# Patient Record
Sex: MALE | Race: WHITE | NOT HISPANIC OR LATINO | Employment: OTHER | URBAN - METROPOLITAN AREA
[De-identification: names, ages, dates, MRNs, and addresses within clinical notes are randomized per-mention and may not be internally consistent; named-entity substitution may affect disease eponyms.]

---

## 2021-09-08 ENCOUNTER — HOSPITAL ENCOUNTER (EMERGENCY)
Facility: HOSPITAL | Age: 76
Discharge: HOME | End: 2021-09-08
Attending: EMERGENCY MEDICINE | Admitting: EMERGENCY MEDICINE

## 2021-09-08 ENCOUNTER — APPOINTMENT (OUTPATIENT)
Dept: GENERAL RADIOLOGY | Facility: HOSPITAL | Age: 76
End: 2021-09-08

## 2021-09-08 VITALS
RESPIRATION RATE: 20 BRPM | BODY MASS INDEX: 27.4 KG/M2 | DIASTOLIC BLOOD PRESSURE: 55 MMHG | HEIGHT: 69 IN | SYSTOLIC BLOOD PRESSURE: 104 MMHG | HEART RATE: 81 BPM | OXYGEN SATURATION: 96 % | WEIGHT: 185 LBS | TEMPERATURE: 98.9 F

## 2021-09-08 DIAGNOSIS — U09.9 POST-COVID-19 SYNDROME: Primary | ICD-10-CM

## 2021-09-08 DIAGNOSIS — Z99.81 SUPPLEMENTAL OXYGEN DEPENDENT: ICD-10-CM

## 2021-09-08 LAB
ALBUMIN SERPL-MCNC: 3.3 G/DL (ref 3.5–5.2)
ALBUMIN/GLOB SERPL: 0.9 G/DL
ALP SERPL-CCNC: 63 U/L (ref 39–117)
ALT SERPL W P-5'-P-CCNC: 6 U/L (ref 1–41)
ANION GAP SERPL CALCULATED.3IONS-SCNC: 11 MMOL/L (ref 5–15)
AST SERPL-CCNC: 17 U/L (ref 1–40)
BASOPHILS # BLD AUTO: 0.02 10*3/MM3 (ref 0–0.2)
BASOPHILS NFR BLD AUTO: 0.2 % (ref 0–1.5)
BILIRUB SERPL-MCNC: 1 MG/DL (ref 0–1.2)
BUN SERPL-MCNC: 20 MG/DL (ref 8–23)
BUN/CREAT SERPL: 21.7 (ref 7–25)
CALCIUM SPEC-SCNC: 9 MG/DL (ref 8.6–10.5)
CHLORIDE SERPL-SCNC: 103 MMOL/L (ref 98–107)
CO2 SERPL-SCNC: 29 MMOL/L (ref 22–29)
CREAT SERPL-MCNC: 0.92 MG/DL (ref 0.76–1.27)
DEPRECATED RDW RBC AUTO: 46 FL (ref 37–54)
EOSINOPHIL # BLD AUTO: 0.1 10*3/MM3 (ref 0–0.4)
EOSINOPHIL NFR BLD AUTO: 0.9 % (ref 0.3–6.2)
ERYTHROCYTE [DISTWIDTH] IN BLOOD BY AUTOMATED COUNT: 13.6 % (ref 12.3–15.4)
GFR SERPL CREATININE-BSD FRML MDRD: 80 ML/MIN/1.73
GLOBULIN UR ELPH-MCNC: 3.6 GM/DL
GLUCOSE SERPL-MCNC: 130 MG/DL (ref 65–99)
HCT VFR BLD AUTO: 42.8 % (ref 37.5–51)
HGB BLD-MCNC: 14.3 G/DL (ref 13–17.7)
HOLD SPECIMEN: NORMAL
IMM GRANULOCYTES # BLD AUTO: 0.17 10*3/MM3 (ref 0–0.05)
IMM GRANULOCYTES NFR BLD AUTO: 1.6 % (ref 0–0.5)
LYMPHOCYTES # BLD AUTO: 0.6 10*3/MM3 (ref 0.7–3.1)
LYMPHOCYTES NFR BLD AUTO: 5.7 % (ref 19.6–45.3)
MCH RBC QN AUTO: 30.4 PG (ref 26.6–33)
MCHC RBC AUTO-ENTMCNC: 33.4 G/DL (ref 31.5–35.7)
MCV RBC AUTO: 91.1 FL (ref 79–97)
MONOCYTES # BLD AUTO: 0.85 10*3/MM3 (ref 0.1–0.9)
MONOCYTES NFR BLD AUTO: 8.1 % (ref 5–12)
NEUTROPHILS NFR BLD AUTO: 8.81 10*3/MM3 (ref 1.7–7)
NEUTROPHILS NFR BLD AUTO: 83.5 % (ref 42.7–76)
NRBC BLD AUTO-RTO: 0 /100 WBC (ref 0–0.2)
NT-PROBNP SERPL-MCNC: 1627 PG/ML (ref 0–1800)
PLATELET # BLD AUTO: 337 10*3/MM3 (ref 140–450)
PMV BLD AUTO: 9.6 FL (ref 6–12)
POTASSIUM SERPL-SCNC: 4 MMOL/L (ref 3.5–5.2)
PROT SERPL-MCNC: 6.9 G/DL (ref 6–8.5)
QT INTERVAL: 408 MS
QTC INTERVAL: 461 MS
RBC # BLD AUTO: 4.7 10*6/MM3 (ref 4.14–5.8)
SODIUM SERPL-SCNC: 143 MMOL/L (ref 136–145)
TROPONIN T SERPL-MCNC: <0.01 NG/ML (ref 0–0.03)
WBC # BLD AUTO: 10.55 10*3/MM3 (ref 3.4–10.8)
WHOLE BLOOD HOLD SPECIMEN: NORMAL
WHOLE BLOOD HOLD SPECIMEN: NORMAL

## 2021-09-08 PROCEDURE — 83880 ASSAY OF NATRIURETIC PEPTIDE: CPT | Performed by: EMERGENCY MEDICINE

## 2021-09-08 PROCEDURE — 80053 COMPREHEN METABOLIC PANEL: CPT | Performed by: EMERGENCY MEDICINE

## 2021-09-08 PROCEDURE — 99283 EMERGENCY DEPT VISIT LOW MDM: CPT

## 2021-09-08 PROCEDURE — 93005 ELECTROCARDIOGRAM TRACING: CPT | Performed by: EMERGENCY MEDICINE

## 2021-09-08 PROCEDURE — 84484 ASSAY OF TROPONIN QUANT: CPT | Performed by: EMERGENCY MEDICINE

## 2021-09-08 PROCEDURE — 85025 COMPLETE CBC W/AUTO DIFF WBC: CPT | Performed by: EMERGENCY MEDICINE

## 2021-09-08 PROCEDURE — 71046 X-RAY EXAM CHEST 2 VIEWS: CPT

## 2021-09-08 RX ORDER — SODIUM CHLORIDE 0.9 % (FLUSH) 0.9 %
10 SYRINGE (ML) INJECTION AS NEEDED
Status: DISCONTINUED | OUTPATIENT
Start: 2021-09-08 | End: 2021-09-08 | Stop reason: HOSPADM

## 2021-09-08 NOTE — CASE MANAGEMENT/SOCIAL WORK
Continued Stay Note  [facility]     Patient Name: Aubrey Interiano  MRN: [id number]  Today's Date: 9/8/2021    Admit Date: 9/8/2021    Discharge Plan     Row Name 09/08/21 1308       Plan    Plan Home    Plan Comments Ready for discharge and in need of 02 tank for trip.  Contacted [employer], #[phone], will deliver tank to patient in ED lobby.  Patient updated via charge nurse.  Nancy Dial, Ext. [phone]    Final Discharge Disposition Code 01 - home or self-care        Discharge Codes    No documentation.             GENNARO Aponte

## 2021-09-08 NOTE — ED PROVIDER NOTES
"Subjective   The patient presents to the emergency department with ongoing shortness of breath dependent on supplemental oxygen secondary to COVID-19.  Patient reports onset of symptoms and diagnosis on August 28.  The patient has a history of sleep apnea and uses CPAP at night, but during Covid, he has required supplemental oxygen 24/7.  The patient reports he is currently on 4 L via nasal cannula.  He does not report any significant worsening but states that he is just \"ready to get off this oxygen\".  Patient denies any current fever or chills.  Patient does not report some persistent cough and shortness of breath.  No nausea, vomiting, or diarrhea.      History provided by:  Patient      Review of Systems   Constitutional: Negative.    Respiratory: Positive for cough and shortness of breath.    Cardiovascular: Negative.    Gastrointestinal: Negative.    Musculoskeletal: Negative.    Skin: Negative.    Neurological: Negative.    Psychiatric/Behavioral: Negative.    All other systems reviewed and are negative.      No past medical history on file.    Allergies   Allergen Reactions   • Aspirin Angioedema   • Lisinopril Angioedema       No past surgical history on file.    No family history on file.    Social History     Socioeconomic History   • Marital status:      Spouse name: Not on file   • Number of children: Not on file   • Years of education: Not on file   • Highest education level: Not on file           Objective   Physical Exam  Vitals and nursing note reviewed.   Constitutional:       Appearance: He is well-developed. He is obese.   HENT:      Head: Normocephalic and atraumatic.   Cardiovascular:      Rate and Rhythm: Normal rate and regular rhythm.      Pulses: Normal pulses.   Pulmonary:      Effort: Pulmonary effort is normal. No tachypnea.      Breath sounds: Normal breath sounds.   Abdominal:      Palpations: Abdomen is soft.      Tenderness: There is no abdominal tenderness.   Musculoskeletal: "         General: Normal range of motion.      Right lower leg: No edema.      Left lower leg: No edema.   Skin:     General: Skin is warm and dry.   Neurological:      General: No focal deficit present.      Mental Status: He is alert and oriented to person, place, and time.   Psychiatric:         Mood and Affect: Mood normal.         Behavior: Behavior normal.         Procedures           ED Course  ED Course as of Sep 08 1919   Wed Sep 08, 2021   1125 Personally reviewed 2 views of the chest that shows bilateral changes consistent with known history of COVID-19 with no focal dense infiltrate noted.  See report for radiology for details.   XR Chest 2 View [RS]   1234 No emergent findings on her evaluation here in the ER.  Patient has findings consistent with post viral Covid symptoms.  We will plan to discharge the patient home with symptomatic management to continue follow-up with his doctor and symptomatic management as needed. I had a discussion with the patient/family regarding diagnosis, diagnostic results, treatment plan, and medications.  The patient/family indicated understanding of these instructions.  I spent adequate time at the bedside proceeding discharge necessary to personally discuss the aftercare instructions, giving patient education, providing explanations of the results of our evaluations/findings, and my decision making to assure that the patient/family understand the plan of care.  Time was allotted to answer questions at that time and throughout the ED course.  Emphasis was placed on timely follow-up after discharge.  I also discussed the potential for the development of an acute emergent condition requiring further evaluation, admission, or even surgical intervention. I discussed that we found nothing during the visit today indicating the need for further workup, admission, or the presence of an unstable medical condition.  I encouraged the patient to return to the emergency department  immediately for ANY concerns, worsening, new complaints, or if symptoms persist and unable to seek follow-up in a timely fashion.  The patient/family expressed understanding and agreement with this plan.     [RS]      ED Course User Index  [RS] Jose Andrade MD                                           MDM  Number of Diagnoses or Management Options  Post-COVID-19 syndrome  Supplemental oxygen dependent  Diagnosis management comments: Recent Results (from the past 24 hour(s))  -Green Top (Gel)  Collection Time: 09/08/21 10:50 AM       Result                      Value             Ref Range           Extra Tube                                                    Hold for add-ons.  -Lavender Top  Collection Time: 09/08/21 10:50 AM       Result                      Value             Ref Range           Extra Tube                                                    hold for add-on  -Gold Top - SST  Collection Time: 09/08/21 10:50 AM       Result                      Value             Ref Range           Extra Tube                                                    Hold for add-ons.  -Gray Top  Collection Time: 09/08/21 10:50 AM       Result                      Value             Ref Range           Extra Tube                                                    Hold for add-ons.  -Light Blue Top  Collection Time: 09/08/21 10:50 AM       Result                      Value             Ref Range           Extra Tube                                                    hold for add-on  -Comprehensive Metabolic Panel  Collection Time: 09/08/21 10:50 AM  Specimen: Blood       Result                      Value             Ref Range           Glucose                     130 (H)           65 - 99 mg/dL       BUN                         20                8 - 23 mg/dL        Creatinine                  0.92              0.76 - 1.27 *       Sodium                      143               136 - 145 mm*       Potassium                    4.0               3.5 - 5.2 mm*       Chloride                    103               98 - 107 mmo*       CO2                         29.0              22.0 - 29.0 *       Calcium                     9.0               8.6 - 10.5 m*       Total Protein               6.9               6.0 - 8.5 g/*       Albumin                     3.30 (L)          3.50 - 5.20 *       ALT (SGPT)                  6                 1 - 41 U/L          AST (SGOT)                  17                1 - 40 U/L          Alkaline Phosphatase        63                39 - 117 U/L        Total Bilirubin             1.0               0.0 - 1.2 mg*       eGFR Non  Amer       80                >60 mL/min/1*       Globulin                    3.6               gm/dL               A/G Ratio                   0.9               g/dL                BUN/Creatinine Ratio        21.7              7.0 - 25.0          Anion Gap                   11.0              5.0 - 15.0 m*  -BNP  Collection Time: 09/08/21 10:50 AM  Specimen: Blood       Result                      Value             Ref Range           proBNP                      1,627.0           0.0-1,800.0 *  -Troponin  Collection Time: 09/08/21 10:50 AM  Specimen: Blood       Result                      Value             Ref Range           Troponin T                  <0.010            0.000 - 0.03*  -CBC Auto Differential  Collection Time: 09/08/21 10:50 AM  Specimen: Blood       Result                      Value             Ref Range           WBC                         10.55             3.40 - 10.80*       RBC                         4.70              4.14 - 5.80 *       Hemoglobin                  14.3              13.0 - 17.7 *       Hematocrit                  42.8              37.5 - 51.0 %       MCV                         91.1              79.0 - 97.0 *       MCH                         30.4              26.6 - 33.0 *       MCHC                        33.4              31.5 - 35.7  *       RDW                         13.6              12.3 - 15.4 %       RDW-SD                      46.0              37.0 - 54.0 *       MPV                         9.6               6.0 - 12.0 fL       Platelets                   337               140 - 450 10*       Neutrophil %                83.5 (H)          42.7 - 76.0 %       Lymphocyte %                5.7 (L)           19.6 - 45.3 %       Monocyte %                  8.1               5.0 - 12.0 %        Eosinophil %                0.9               0.3 - 6.2 %         Basophil %                  0.2               0.0 - 1.5 %         Immature Grans %            1.6 (H)           0.0 - 0.5 %         Neutrophils, Absolute       8.81 (H)          1.70 - 7.00 *       Lymphocytes, Absolute       0.60 (L)          0.70 - 3.10 *       Monocytes, Absolute         0.85              0.10 - 0.90 *       Eosinophils, Absolute       0.10              0.00 - 0.40 *       Basophils, Absolute         0.02              0.00 - 0.20 *       Immature Grans, Absolu*     0.17 (H)          0.00 - 0.05 *       nRBC                        0.0               0.0 - 0.2 /1*  -ECG 12 Lead  Collection Time: 09/08/21 10:51 AM       Result                      Value             Ref Range           QT Interval                 408               ms                  QTC Interval                461               ms             Note: In addition to lab results from this visit, the labs listed above may include labs taken at another facility or during a different encounter within the last 24 hours. Please correlate lab times with ED admission and discharge times for further clarification of the services performed during this visit.    XR Chest 2 View   Preliminary Result    Patchy diffuse airspace disease identified throughout the    lung fields bilaterally. The upper lung fields are grossly clear.         D:  09/08/2021    E:  09/08/2021          ---------------------------                "09/08/21                      1024         ---------------------------   BP:          104/55         BP Location:    Right arm       Patient Position:     Sitting        Pulse:         81           Resp:          20           Temp:   98.9 °F (37.2 °C)   TempSrc:      Oral          SpO2:          96%          Weight: 83.9 kg (185 lb)    Height:  175.3 cm (69\")    ---------------------------  Medications - No data to display  ECG/EMG Results (last 24 hours)     Procedure Component Value Units Date/Time    ECG 12 Lead ([id number]) Collected: 09/08/21 1051     Updated: 09/08/21 1552     QT Interval 408 ms      QTC Interval 461 ms     Narrative:      Test Reason : SOA Protocol  Blood Pressure :   */*   mmHG  Vent. Rate :  77 BPM     Atrial Rate :   * BPM     P-R Int :   * ms          QRS Dur : 118 ms      QT Int : 408 ms       P-R-T Axes :   * -35 115 degrees     QTc Int : 461 ms    Normal sinus rhythm  Left axis deviation  Left ventricular hypertrophy with QRS widening ( R in aVL , [employer]   product )  Nonspecific ST and T wave abnormality  Prolonged QT  Abnormal ECG  No previous ECGs available  Confirmed by LEE SALEEM MD ([id number]) on 9/8/2021 3:51:50 PM    Referred By:            Confirmed By: ELE SALEEM MD      ECG 12 Lead   Final Result    Test Reason : SOA Protocol    Blood Pressure :   */*   mmHG    Vent. Rate :  77 BPM     Atrial Rate :   * BPM       P-R Int :   * ms          QRS Dur : 118 ms        QT Int : 408 ms       P-R-T Axes :   * -35 115 degrees       QTc Int : 461 ms        Normal sinus rhythm    Left axis deviation    Left ventricular hypertrophy with QRS widening ( R in aVL , [employer]     product )    Nonspecific ST and T wave abnormality    Prolonged QT    Abnormal ECG    No previous ECGs available    Confirmed by ELE SALEEM MD ([id number]) on 9/8/2021 3:51:50 PM        Referred By:            Confirmed By: ELE SALEEM MD            Amount and/or Complexity of Data " Reviewed  Clinical lab tests: reviewed  Tests in the radiology section of CPT®: reviewed  Independent visualization of images, tracings, or specimens: yes        Final diagnoses:   Post-COVID-19 syndrome   Supplemental oxygen dependent       ED Disposition  ED Disposition     ED Disposition Condition Comment    Discharge Stable           Tomer Shultz MD  [address] [place] [address] [address] [place] [address] [phone]    Schedule an appointment as soon as possible for a visit       [facility] Emergency Department  [address] [place] [id number] [phone]    As needed, If symptoms worsen or ANY concerns.         Medication List      No changes were made to your prescriptions during this visit.          Joes Andrade MD  09/08/21 5712

## 2023-02-28 PROCEDURE — 93306 TTE W/DOPPLER COMPLETE: CPT | Performed by: INTERNAL MEDICINE

## 2023-03-02 ENCOUNTER — OUTSIDE FACILITY SERVICE (OUTPATIENT)
Dept: CARDIOLOGY | Facility: CLINIC | Age: 78
End: 2023-03-02
Payer: MEDICARE